# Patient Record
Sex: MALE | HISPANIC OR LATINO | URBAN - METROPOLITAN AREA
[De-identification: names, ages, dates, MRNs, and addresses within clinical notes are randomized per-mention and may not be internally consistent; named-entity substitution may affect disease eponyms.]

---

## 2017-02-14 ENCOUNTER — HOSPITAL ENCOUNTER (EMERGENCY)
Facility: MEDICAL CENTER | Age: 47
End: 2017-02-14
Attending: EMERGENCY MEDICINE

## 2017-02-14 VITALS
RESPIRATION RATE: 18 BRPM | OXYGEN SATURATION: 97 % | BODY MASS INDEX: 24.1 KG/M2 | HEIGHT: 65 IN | DIASTOLIC BLOOD PRESSURE: 77 MMHG | TEMPERATURE: 98.1 F | SYSTOLIC BLOOD PRESSURE: 135 MMHG | WEIGHT: 144.62 LBS | HEART RATE: 78 BPM

## 2017-02-14 DIAGNOSIS — S90.821A FRICTION BLISTERS OF SOLE OF RIGHT FOOT, INITIAL ENCOUNTER: ICD-10-CM

## 2017-02-14 DIAGNOSIS — L03.116 CELLULITIS OF LEFT LOWER EXTREMITY: ICD-10-CM

## 2017-02-14 DIAGNOSIS — S90.822A FRICTION BLISTERS OF SOLE OF LEFT FOOT, INITIAL ENCOUNTER: ICD-10-CM

## 2017-02-14 LAB
GRAM STN SPEC: NORMAL
SIGNIFICANT IND 70042: NORMAL
SITE SITE: NORMAL
SOURCE SOURCE: NORMAL

## 2017-02-14 PROCEDURE — 700111 HCHG RX REV CODE 636 W/ 250 OVERRIDE (IP): Performed by: EMERGENCY MEDICINE

## 2017-02-14 PROCEDURE — 90715 TDAP VACCINE 7 YRS/> IM: CPT | Performed by: EMERGENCY MEDICINE

## 2017-02-14 PROCEDURE — 90471 IMMUNIZATION ADMIN: CPT

## 2017-02-14 PROCEDURE — 87070 CULTURE OTHR SPECIMN AEROBIC: CPT

## 2017-02-14 PROCEDURE — 87205 SMEAR GRAM STAIN: CPT

## 2017-02-14 PROCEDURE — 99283 EMERGENCY DEPT VISIT LOW MDM: CPT

## 2017-02-14 RX ORDER — CEPHALEXIN 500 MG/1
500 CAPSULE ORAL 2 TIMES DAILY
Qty: 20 QUANTITY SUFFICIENT | Refills: 0 | Status: SHIPPED | OUTPATIENT
Start: 2017-02-14

## 2017-02-14 RX ORDER — SULFAMETHOXAZOLE AND TRIMETHOPRIM 800; 160 MG/1; MG/1
1 TABLET ORAL 2 TIMES DAILY
Qty: 14 TAB | Refills: 0 | Status: SHIPPED | OUTPATIENT
Start: 2017-02-14 | End: 2017-02-21

## 2017-02-14 RX ORDER — HYDROCODONE BITARTRATE AND ACETAMINOPHEN 5; 325 MG/1; MG/1
1-2 TABLET ORAL EVERY 6 HOURS PRN
Qty: 10 TAB | Refills: 0 | Status: SHIPPED | OUTPATIENT
Start: 2017-02-14

## 2017-02-14 RX ADMIN — CLOSTRIDIUM TETANI TOXOID ANTIGEN (FORMALDEHYDE INACTIVATED), CORYNEBACTERIUM DIPHTHERIAE TOXOID ANTIGEN (FORMALDEHYDE INACTIVATED), BORDETELLA PERTUSSIS TOXOID ANTIGEN (GLUTARALDEHYDE INACTIVATED), BORDETELLA PERTUSSIS FILAMENTOUS HEMAGGLUTININ ANTIGEN (FORMALDEHYDE INACTIVATED), BORDETELLA PERTUSSIS PERTACTIN ANTIGEN, AND BORDETELLA PERTUSSIS FIMBRIAE 2/3 ANTIGEN 0.5 ML: 5; 2; 2.5; 5; 3; 5 INJECTION, SUSPENSION INTRAMUSCULAR at 08:02

## 2017-02-14 NOTE — ED PROVIDER NOTES
"ED Provider Note    CHIEF COMPLAINT  Chief Complaint   Patient presents with   • Blisters     bilat feet X \"days.\" 8/10 pain       HPI  Miguel Angel Reyes-Picon is a 46 y.o. male who presents with blisters, bilateral of both feet, plantar surfaces, the last week, somewhat painful on the left. No fever no chills no nausea no vomiting. One of the blisters, on the left foot has popped, others are unruptured. Pain moderate when he walks otherwise no pain no radiation gradual in onset. He walks a great deal and does not change his socks or shoes, goes for weeks without changing his socks    REVIEW OF SYSTEMS  See HPI for further details. No history of diabetes Denies other G.I., G.U.. endrocine, cardiovascular, respriatory or neurological problems.  All other systems are negative.     PAST MEDICAL HISTORY  History reviewed. No pertinent past medical history.    FAMILY HISTORY  History reviewed. No pertinent family history.    SOCIAL HISTORY  Social History     Social History   • Marital Status: Single     Spouse Name: N/A   • Number of Children: N/A   • Years of Education: N/A     Social History Main Topics   • Smoking status: Never Smoker    • Smokeless tobacco: None   • Alcohol Use: 1.0 - 2.0 oz/week     2-4 Cans of beer per week      Comment: socially   • Drug Use: No   • Sexual Activity: Not Asked     Other Topics Concern   • None     Social History Narrative       SURGICAL HISTORY  History reviewed. No pertinent past surgical history.    CURRENT MEDICATIONS  Home Medications     Reviewed by Marv Nicole R.N. (Registered Nurse) on 02/14/17 at 0761  Med List Status: Not Addressed    Medication Last Dose Status          Patient Franco Taking any Medications                        ALLERGIES  No Known Allergies    PHYSICAL EXAM  VITAL SIGNS: /77 mmHg  Pulse 78  Temp(Src) 36.7 °C (98.1 °F)  Resp 18  Ht 1.651 m (5' 5\")  Wt 65.6 kg (144 lb 10 oz)  BMI 24.07 kg/m2  SpO2 97%  Constitutional: Well developed, " Well nourished, No acute distress, Non-toxic appearance.   HENT: Normocephalic, Atraumatic, Bilateral external ears normal, Oropharynx moist, No oral exudates, Nose normal.   Eyes: PERRL, EOMI, Conjunctiva normal, No discharge.   Neck: Normal range of motion, No tenderness, Supple, No stridor.   Lymphatic: No lymphadenopathy noted.   Cardiovascular: Normal heart rate, Normal rhythm, No murmurs, No rubs, No gallops.   Thorax & Lungs: Normal breath sounds, No respiratory distress, No wheezing, No chest tenderness.   Abdomen:  No tenderness, no guarding no rigidity and the abdomen is soft.  No masses, No pulsatile masses.  Skin: Warm, Dry, No erythema, No rash.   Back: No tenderness, No CVA tenderness.   Extremities: Intact distal pulses, examination of his feet, both feet are somewhat damp, from wearing damp shoes, normal plantar surface of both feet, there are friction blisters, unruptured on the right foot, however on the left foot, one of the blisters, distal at the 3rd toe, there is rupture slightly tender.    Not seeing signs of severe cellulitis or additional swelling. No redness on the dorsum of the feet No cyanosis, No clubbing.   Musculoskeletal: Good range of motion in all major joints except both feet as above Neurologic: Alert & oriented x 3, Normal motor function, Normal sensory function, No focal deficits noted.   Psychiatric: Affect normal, Judgment normal, Mood normal.       RADIOLOGY/PROCEDURES      COURSE & MEDICAL DECISION MAKING  Pertinent Labs & Imaging studies reviewed. (See chart for details)    He has friction blisters, both feet, there is one that is ruptured, left foot possibly slightly infected. Culture is done, she is given Keflex, Septra,. Today he is to go home, worse his feet, although we are also worked his feet here Neosporin to plantar surfaces of both feet, he needs to wear clean socks and change him several times a day., Neosporin to the left foot. Norco for pain. I have checked  with the MP. Tetanus immunization also given    FINAL IMPRESSION  1.   1. Friction blisters of sole of left foot, initial encounter    2. Friction blisters of sole of right foot, initial encounter    3. Cellulitis of left lower extremity        2.   3.     Disposition  Discharge instructions are understood. This patient is to return if fever vomiting or no better in 12 hours. Follow up with the [facility] clinic or private physician. Information sheets on friction blisters, cellulitis  Electronically signed by: Fadi Valera, 2/14/2017 7:31 AM

## 2017-02-14 NOTE — DISCHARGE INSTRUCTIONS
Celulitis  (Cellulitis)  La celulitis es lester infección de la piel y del tejido subyacente. El área infectada generalmente está de color minor y duele. Ocurre con más frecuencia en los brazos y en las piernas.   CAUSAS   La causa es lester bacteria que ingresa en la piel a través de grietas o ramirez. Los tipos más frecuentes de bacterias que causan celulitis son los estafilococos y los estreptococos.  SIGNOS Y SÍNTOMAS   · Enrojecimiento y calor.  · Hinchazón.  · Sensibilidad o dolor.  · Fiebre.  DIAGNÓSTICO   Por lo general, el médico puede diagnosticar esta afección con un examen físico. Es posible que le indiquen análisis de namita.  TRATAMIENTO   El tratamiento consiste en kristen antibióticos.  INSTRUCCIONES PARA EL CUIDADO EN EL HOGAR    · [place] los antibióticos get le indicó el médico. Termine los antibióticos aunque comience a sentirse mejor.  · Mantenga el brazo o la pierna elevados para reducir la hinchazón.  · Aplique paños calientes en la akanksha hasta 4 veces al día para calmar el dolor.  · [place] los medicamentos solamente get se lo haya indicado el médico.  · Concurra a todas las visitas de control get se lo haya indicado el médico.  SOLICITE ATENCIÓN MÉDICA SI:   · Observa lester línea rogers en la piel que sale desde la akanksha infectada.  · El área rogers se extiende o se vuelve de color oscuro.  · El hueso o la articulación que se encuentran por debajo de la akanksha infectada le duelen después de que la piel se talon.  · La infección se repite en la misma akanksha o en lester akanksha diferente.  · Tiene un bulto inflamado en la akanksha infectada.  · Presenta nuevos síntomas.  · Tiene fiebre.  SOLICITE ATENCIÓN MÉDICA DE INMEDIATO SI:   · Se siente muy somnoliento.  · Tiene vómitos o diarrea.  · Se siente enfermo (malestar general) con karina musculares.  ASEGÚRESE DE QUE:   · Comprende estas instrucciones.  · Controlará salinas afección.  · Recibirá ayuda de inmediato si no mejora o si empeora.     Esta información no tiene get fin  reemplazar el consejo del médico. Asegúrese de hacerle al médico cualquier pregunta que tenga.     Document Released: 09/27/2006 Document Revised: 01/08/2016  [employer] Interactive Patient Education ©2016 Elsevier Inc.    Ampollas  (Blisters)  Lester ampolla es un saco lleno de líquido que se forma entre las capas de la piel. Generalmente, las ampollas se teri en las zonas donde hay roce de piel o donde la piel roza con otra cosa. Las zonas de ampollas más comunes son las whit y los pies.  CAUSAS  Las causas de lester ampolla pueden ser las siguientes:  · Lester lesión.  · Lester quemadura.  · Lester reacción alérgica.  · Lester infección.  · Exposición a sustancias químicas irritantes.  · Fricción.  Las ampollas causadas por fricción suelen deberse a lo siguiente:  · Deportes.  · Actividades repetitivas.  · Zapatos muy ajustados o muy flojos.  SIGNOS Y SÍNTOMAS  Lester ampolla suele ser redonda y tener aspecto abultado. Puede picar o doler al tacto. El líquido dentro de lester ampolla es transparente o sanguinolento. Antes de que se forme lester ampolla, la piel puede enrojecerse, picar, estar caliente o causar dolor al tacto.  DIAGNÓSTICO  Generalmente, lester ampolla se diagnostica a partir de salinas aspecto.  TRATAMIENTO  El tratamiento incluye proteger la akanksha donde se formó la ampolla hasta tanto la piel haya cicatrizado. Si hay probabilidad de fricción, coloque un vendaje (apósito) con un orificio en el medio sobre la ampolla.  La mayoría de las ampollas se rompen, se secan y desaparecen solas en el término de 10 días. En contadas ocasiones, se pueden drenar las ampollas muy dolorosas, antes de que se rompan solas. Solo un médico debe drenar lester ampolla y hacerlo en condiciones estériles.  INSTRUCCIONES PARA EL CUIDADO EN EL HOGAR  · Proteja la akanksha donde se formó la ampolla get se lo haya indicado el médico.  · No rompa ni reviente la ampolla ya que podría infectarse.  · Si la ampolla es muy dolorosa, consulte al médico para saber si es  necesario drenarla.  · Si la ampolla se rompe elvis:  ¨ No quite la piel suelta que la recubre.  ¨ Lave la akanksha de la ampolla con agua y jabón todos los días.  ¨ Después de hacerlo, puede aplicar lester crema o un ungüento con antibióticos y cubrir la akanksha con un vendaje.  PREVENCIÓN  Para ayudar a evitar las ampollas causadas por fricción, siga estos pasos:  · Use calzado cómodo, que le quede erik.  · Cuando use zapatos, siempre póngase medias.  · Póngase medias de más o cinta adhesiva, vendajes o protectores sobre las zonas propensas a la formación de ampollas, si es necesario.  · Use equipo de protección, get guantes, cuando practique deportes o realice actividades que puedan causar ampollas.  · Use talcos si es necesario para mantener los pies secos.  SOLICITE ATENCIÓN MÉDICA SI:  · Aumenta el enrojecimiento, la hinchazón o el dolor en la akanksha de la ampolla.  · Tiene secreción similar al pus en la akanksha de la ampolla.  · Tiene fiebre.  · Tiene escalofríos.     Esta información no tiene get fin reemplazar el consejo del médico. Asegúrese de hacerle al médico cualquier pregunta que tenga.     Document Released: 12/18/2006 Document Revised: 01/08/2016  [employer] Interactive Patient Education ©2016 Elsevier Inc.  Warm soaks 10 minutes every 2 hours.Return if fever, vomiting or if no better in 12 hours.

## 2017-02-14 NOTE — ED NOTES
"Miguel Angel Reyes-Picon  46 y.o. male  Chief Complaint   Patient presents with   • Blisters     bilat feet X \"days.\" 8/10 pain     Pt amb with steady gait for above complaint. Per pt, pt walks \"too much\" everyday.  Pt is Korean speaking with limited english.  Pt placed in lobby. Pt educated on triage process. Pt encouraged to alert staff for any changes.    "

## 2017-02-14 NOTE — ED AVS SNAPSHOT
Datical Access Code: [email or web address]  Expires: 3/16/2017  8:18 AM    Your email address is not on file at [facility].  Email Addresses are required for you to sign up for Datical, please contact [phone] to verify your personal information and to provide your email address prior to attempting to register for Datical.    Ortega Reyes-Picon  [address] [place] [address]    Datical  A secure, online tool to manage your health information     [facility]’s Datical® is a secure, online tool that connects you to your personalized health information from the privacy of your home -- day or night - making it very easy for you to manage your healthcare. Once the activation process is completed, you can even access your medical information using the Datical holli, which is available for free in the Apple Holli store or Google Play store.     To learn more about Datical, visit [email or web address]    There are two levels of access available (as shown below):   My Chart Features  [facility] Primary Care Doctor [facility]  Specialists [facility]  Urgent  Care Non-[facility] Primary Care Doctor   Email your healthcare team securely and privately 24/7 X X X    Manage appointments: schedule your next appointment; view details of past/upcoming appointments X      Request prescription refills. X      View recent personal medical records, including lab and immunizations X X X X   View health record, including health history, allergies, medications X X X X   Read reports about your outpatient visits, procedures, consult and ER notes X X X X   See your discharge summary, which is a recap of your hospital and/or ER visit that includes your diagnosis, lab results, and care plan X X  X     How to register for Datical:  Once your e-mail address has been verified, follow the following steps to sign up for Datical.     1. Go to  [email or web address]  2. Click on the Sign Up Now box, which takes you to the New Member Sign Up page.  You will need to provide the following information:  a. Enter your [address] Access Code exactly as it appears at the top of this page. (You will not need to use this code after you’ve completed the sign-up process. If you do not sign up before the expiration date, you must request a new code.)   b. Enter your date of birth.   c. Enter your home email address.   d. Click Submit, and follow the next screen’s instructions.  3. Create a [employer] ID. This will be your [address] login ID and cannot be changed, so think of one that is secure and easy to remember.  4. Create a [address] password. You can change your password at any time.  5. Enter your Password Reset Question and Answer. This can be used at a later time if you forget your password.   6. Enter your e-mail address. This allows you to receive e-mail notifications when new information is available in [address].  7. Click Sign Up. You can now view your health information.    For assistance activating your [address] account, call [phone]

## 2017-02-14 NOTE — ED AVS SNAPSHOT
2/14/2017          Miguel Angel Reyes-Picon  [address] [place] [id number]    Dear Sina:    [facility] wants to ensure your discharge home is safe and you or your loved ones have had all your questions answered regarding your care after you leave the hospital.    You may receive a telephone call within two days of your discharge.  This call is to make certain you understand your discharge instructions as well as ensure we provided you with the best care possible during your stay with us.     The call will only last approximately 3-5 minutes and will be done by a nurse.    Once again, we want to ensure your discharge home is safe and that you have a clear understanding of any next steps in your care.  If you have any questions or concerns, please do not hesitate to contact us, we are here for you.  Thank you for choosing [facility] for your healthcare needs.    Sincerely,    Star Marquez    [facility]

## 2017-02-14 NOTE — ED AVS SNAPSHOT
Home Care Instructions                                                                                                                Miguel Angel Reyes-Picon   MRN: [id number]    Department:  [facility], Emergency Dept   Date of Visit:  2/14/2017            [facility], Emergency Dept    [address]    [place] [address]    Phone:  [phone]      You were seen by     Fadi Valera M.D.      Your Diagnosis Was     Friction blisters of sole of left foot, initial encounter     S90.829C       These are the medications you received during your hospitalization from 02/14/2017 0551 to 02/14/2017 0818     Date/Time Order Dose Route Action    02/14/2017 0802 tetanus-dipth-acell pertussis (ADACEL) inj 0.5 mL 0.5 mL Intramuscular Given      Follow-up Information     1. Follow up with [facility]. Schedule an appointment as soon as possible for a visit today.    Contact information    [facility]  [place] [place] [place] [address]  [phone]        Medication Information     Review all of your home medications and newly ordered medications with your primary doctor and/or pharmacist as soon as possible. Follow medication instructions as directed by your doctor and/or pharmacist.     Please keep your complete medication list with you and share with your physician. Update the information when medications are discontinued, doses are changed, or new medications (including over-the-counter products) are added; and carry medication information at all times in the event of emergency situations.               Medication List      START taking these medications        Instructions    cephALEXin 500 MG Caps   Commonly known as:  KEFLEX    Take 1 Cap by mouth 2 times a day.   Dose:  500 mg       hydrocodone-acetaminophen 5-325 MG Tabs per tablet   Commonly known as:  NORCO    Take 1-2 Tabs by mouth every 6 hours as needed.   Dose:  1-2 Tab       sulfamethoxazole-trimethoprim 800-160 MG tablet      Commonly known as:  BACTRIM DS    Take 1 Tab by mouth 2 times a day for 7 days.   Dose:  1 Tab                 Discharge Instructions       Celulitis  (Cellulitis)  La celulitis es lester infección de la piel y del tejido subyacente. El área infectada generalmente está de color minor y duele. Ocurre con más frecuencia en los brazos y en las piernas.   CAUSAS   La causa es lester bacteria que ingresa en la piel a través de grietas o ramirez. Los tipos más frecuentes de bacterias que causan celulitis son los estafilococos y los estreptococos.  SIGNOS Y SÍNTOMAS   · Enrojecimiento y calor.  · Hinchazón.  · Sensibilidad o dolor.  · Fiebre.  DIAGNÓSTICO   Por lo general, el médico puede diagnosticar esta afección con un examen físico. Es posible que le indiquen análisis de namita.  TRATAMIENTO   El tratamiento consiste en kristen antibióticos.  INSTRUCCIONES PARA EL CUIDADO EN EL HOGAR    · [place] los antibióticos get le indicó el médico. [place] los antibióticos aunque comience a sentirse mejor.  · Mantenga el brazo o la pierna elevados para reducir la hinchazón.  · Aplique paños calientes en la akanksha hasta 4 veces al día para calmar el dolor.  · [place] los medicamentos solamente get se lo haya indicado el médico.  · Concurra a todas las visitas de control get se lo haya indicado el médico.  SOLICITE ATENCIÓN MÉDICA SI:   · Observa lester línea rogers en la piel que sale desde la akanksha infectada.  · El área rogers se extiende o se vuelve de color oscuro.  · El hueso o la articulación que se encuentran por debajo de la akanksha infectada le duelen después de que la piel se talon.  · La infección se repite en la misma akanksha o en lester akanksha diferente.  · Tiene un bulto inflamado en la akanksha infectada.  · Presenta nuevos síntomas.  · Tiene fiebre.  SOLICITE ATENCIÓN MÉDICA DE INMEDIATO SI:   · Se siente muy somnoliento.  · Tiene vómitos o diarrea.  · Se siente enfermo (malestar general) con karina musculares.  ASEGÚRESE DE QUE:   · Comprende estas  instrucciones.  · Controlará salinas afección.  · Recibirá ayuda de inmediato si no mejora o si empeora.     Esta información no tiene get fin reemplazar el consejo del médico. Asegúrese de hacerle al médico cualquier pregunta que tenga.     Document Released: 09/27/2006 Document Revised: 01/08/2016  [employer] Patient Education ©2016 [employer]    Ampollas  (Blisters)  Lester ampolla es un saco lleno de líquido que se forma entre las capas de la piel. Generalmente, las ampollas se teri en las zonas donde hay roce de piel o donde la piel roza con otra cosa. Las zonas de ampollas más comunes son las whit y los pies.  CAUSAS  Las causas de lester ampolla pueden ser las siguientes:  · Lester lesión.  · Lester quemadura.  · Lester reacción alérgica.  · Lester infección.  · Exposición a sustancias químicas irritantes.  · Fricción.  Las ampollas causadas por fricción suelen deberse a lo siguiente:  · Deportes.  · Actividades repetitivas.  · Zapatos muy ajustados o muy flojos.  SIGNOS Y SÍNTOMAS  Lester ampolla suele ser redonda y tener aspecto abultado. Puede picar o doler al tacto. El líquido dentro de lester ampolla es transparente o sanguinolento. Antes de que se forme lester ampolla, la piel puede enrojecerse, picar, estar caliente o causar dolor al tacto.  DIAGNÓSTICO  Generalmente, lester ampolla se diagnostica a partir de salinas aspecto.  TRATAMIENTO  El tratamiento incluye proteger la akanksha donde se formó la ampolla hasta tanto la piel haya cicatrizado. Si hay probabilidad de fricción, coloque un vendaje (apósito) con un orificio en el medio sobre la ampolla.  La mayoría de las ampollas se rompen, se secan y desaparecen solas en el término de 10 días. En contadas ocasiones, se pueden drenar las ampollas muy dolorosas, antes de que se rompan solas. Solo un médico debe drenar lester ampolla y hacerlo en condiciones estériles.  INSTRUCCIONES PARA EL CUIDADO EN EL HOGAR  · Proteja la akanksha donde se formó la ampolla get se lo haya indicado el  médico.  · No rompa ni reviente la ampolla ya que podría infectarse.  · Si la ampolla es muy dolorosa, consulte al médico para saber si es necesario drenarla.  · Si la ampolla se rompe elvis:  ¨ No quite la piel suelta que la recubre.  ¨ Lave la akanksha de la ampolla con agua y jabón todos los días.  ¨ Después de hacerlo, puede aplicar lester crema o un ungüento con antibióticos y cubrir la akanksha con un vendaje.  PREVENCIÓN  Para ayudar a evitar las ampollas causadas por fricción, siga estos pasos:  · Use calzado cómodo, que le quede erik.  · Cuando use zapatos, siempre póngase medias.  · Póngase medias de más o cinta adhesiva, vendajes o protectores sobre las zonas propensas a la formación de ampollas, si es necesario.  · Use equipo de protección, get guantes, cuando practique deportes o realice actividades que puedan causar ampollas.  · Use talcos si es necesario para mantener los pies secos.  SOLICITE ATENCIÓN MÉDICA SI:  · Aumenta el enrojecimiento, la hinchazón o el dolor en la akanksha de la ampolla.  · Tiene secreción similar al pus en la akanksha de la ampolla.  · Tiene fiebre.  · Tiene escalofríos.     Esta información no tiene get fin reemplazar el consejo del médico. Asegúrese de hacerle al médico cualquier pregunta que tenga.     Document Released: 12/18/2006 Document Revised: 01/08/2016  [employer] Interactive Patient Education ©2016 [employer]  Warm soaks 10 minutes every 2 hours.Return if fever, vomiting or if no better in 12 hours.          Patient Information     Patient Information    Following emergency treatment: all patient requiring follow-up care must return either to a private physician or a clinic if your condition worsens before you are able to obtain further medical attention, please return to the emergency room.     Billing Information    At [facility], we work to make the billing process streamlined for our patients.  Our Representatives are here to answer any questions you may have regarding your  hospital bill.  If you have insurance coverage and have supplied your insurance information to us, we will submit a claim to your insurer on your behalf.  Should you have any questions regarding your bill, we can be reached online or by phone as follows:  Online: You are able pay your bills online or live chat with our representatives about any billing questions you may have. We are here to help Monday - Friday from 8:00am to 7:30pm and 9:00am - 12:00pm on Saturdays.  Please visit [email or web address]  for more information.   Phone:  [phone] or [phone]    Please note that your emergency physician, surgeon, pathologist, radiologist, anesthesiologist, and other specialists are not employed by [facility] and will therefore bill separately for their services.  Please contact them directly for any questions concerning their bills at the numbers below:     Emergency Physician Services:  [phone]  [facility]:  [phone]  [facility]:  [phone]  [facility]:  [phone]    1. Your final bill may vary from the amount quoted upon discharge if all procedures are not complete at that time, or if your doctor has additional procedures of which we are not aware. You will receive an additional bill if you return to the Emergency Department at [facility] for suture removal regardless of the facility of which the sutures were placed.     2. Please arrange for settlement of this account at the emergency registration.    3. All self-pay accounts are due in full at the time of treatment.  If you are unable to meet this obligation then payment is expected within 4-5 days.     4. If you have had radiology studies (CT, X-ray, Ultrasound, MRI), you have received a preliminary result during your emergency department visit. Please contact the radiology department [phone] to receive a copy of your final result. Please discuss the  Final result with your primary physician or with the follow up physician provided.     Crisis Hotline:  [place] Crisis Hotline:  7-467-YMQITWX or 1-208.729.6905  [place] Crisis Hotline:    1-993.144.2657 or 819-962-4402         ED Discharge Follow Up Questions    1. In order to provide you with very good care, we would like to follow up with a phone call in the next few days.  May we have your permission to contact you?     YES /  NO    2. What is the best phone number to call you? (       )_____-__________    3. What is the best time to call you?      Morning  /  Afternoon  /  Evening                   Patient Signature:  ____________________________________________________________    Date:  ____________________________________________________________

## 2017-02-16 LAB
BACTERIA WND AEROBE CULT: NORMAL
GRAM STN SPEC: NORMAL
SIGNIFICANT IND 70042: NORMAL
SITE SITE: NORMAL
SOURCE SOURCE: NORMAL